# Patient Record
Sex: MALE | Race: WHITE | Employment: STUDENT | URBAN - METROPOLITAN AREA
[De-identification: names, ages, dates, MRNs, and addresses within clinical notes are randomized per-mention and may not be internally consistent; named-entity substitution may affect disease eponyms.]

---

## 2019-07-25 PROCEDURE — 87491 CHLMYD TRACH DNA AMP PROBE: CPT | Performed by: FAMILY MEDICINE

## 2019-07-25 PROCEDURE — 87591 N.GONORRHOEAE DNA AMP PROB: CPT | Performed by: FAMILY MEDICINE

## 2024-12-31 ENCOUNTER — APPOINTMENT (OUTPATIENT)
Dept: GENERAL RADIOLOGY | Age: 24
End: 2024-12-31
Attending: PHYSICIAN ASSISTANT
Payer: COMMERCIAL

## 2024-12-31 ENCOUNTER — HOSPITAL ENCOUNTER (OUTPATIENT)
Age: 24
Discharge: HOME | End: 2024-12-31
Payer: COMMERCIAL

## 2024-12-31 VITALS
OXYGEN SATURATION: 99 % | SYSTOLIC BLOOD PRESSURE: 134 MMHG | TEMPERATURE: 98 F | DIASTOLIC BLOOD PRESSURE: 87 MMHG | HEART RATE: 77 BPM | RESPIRATION RATE: 20 BRPM

## 2024-12-31 DIAGNOSIS — R05.1 ACUTE COUGH: ICD-10-CM

## 2024-12-31 DIAGNOSIS — J40 BRONCHITIS: Primary | ICD-10-CM

## 2024-12-31 LAB
POCT INFLUENZA A: NEGATIVE
POCT INFLUENZA B: NEGATIVE

## 2024-12-31 PROCEDURE — 71046 X-RAY EXAM CHEST 2 VIEWS: CPT | Performed by: PHYSICIAN ASSISTANT

## 2024-12-31 RX ORDER — BENZONATATE 200 MG/1
200 CAPSULE ORAL 3 TIMES DAILY PRN
Qty: 30 CAPSULE | Refills: 0 | Status: SHIPPED | OUTPATIENT
Start: 2024-12-31

## 2024-12-31 RX ORDER — PREDNISONE 20 MG/1
40 TABLET ORAL DAILY
Qty: 10 TABLET | Refills: 0 | Status: SHIPPED | OUTPATIENT
Start: 2024-12-31 | End: 2025-01-05

## 2024-12-31 RX ORDER — ALBUTEROL SULFATE 90 UG/1
2 INHALANT RESPIRATORY (INHALATION) ONCE
Status: COMPLETED | OUTPATIENT
Start: 2024-12-31 | End: 2024-12-31

## 2024-12-31 RX ORDER — DEXTROMETHORPHAN HBR, GUAIFENESIN 60; 1200 MG/1; MG/1
1 TABLET, EXTENDED RELEASE ORAL EVERY 12 HOURS
Qty: 30 TABLET | Refills: 0 | Status: SHIPPED | OUTPATIENT
Start: 2024-12-31

## 2024-12-31 NOTE — DISCHARGE INSTRUCTIONS
Recommendations:    - Motrin every 6-8 hours as needed for pain/fever  - Rest  - Proper Sleep Regimen  - Increase Water Intake  - Mucinex for cough  - Tessalon prescription for cough suppressant  - Allegra-D for nasal/sinus decongestant  - Prednisone 5 days for bronchitis/chest congestion  - Albuterol inhaler as needed every 4-6 hours for coughing fits, wheezing, shortness of breath

## 2024-12-31 NOTE — ED PROVIDER NOTES
Patient Seen in: [facility]      History     Chief Complaint   Patient presents with    Cough     Want to check if I have walking pneumonia. - Entered by patient     Stated Complaint: Cough - Want to check if I have walking pneumonia.    Subjective:   HPI    Patient is a 24-year-old male, presenting to immediate care for evaluation of a acute cough.  Onset: 5 days.  Associated nasal congestion, chest congestion, cough, coughing fits, with associated feeling winded/out of breath with coughing fits and predominately with laying down.  No fevers or chills or myalgias.  No current chest pain or shortness of breath.  Recent sick contacts.  Coming to immediate care for further evaluation and x-ray imaging to rule out underlying walking pneumonia.  Home COVID test negative. Not immunocompromise.      Objective:     History reviewed. No pertinent past medical history.           History reviewed. No pertinent surgical history.             Social History     Socioeconomic History    Marital status: Single   Tobacco Use    Smoking status: Never     Passive exposure: Never    Smokeless tobacco: Never   Vaping Use    Vaping status: Never Used              Review of Systems   Constitutional:  Negative for chills and fever.   HENT:  Positive for congestion. Negative for ear discharge, ear pain, facial swelling, sore throat, trouble swallowing and voice change.    Eyes:  Negative for visual disturbance.   Respiratory:  Positive for cough, chest tightness and shortness of breath.    Gastrointestinal:  Negative for abdominal pain, diarrhea and vomiting.   Musculoskeletal:  Negative for neck pain and neck stiffness.   Skin:  Negative for rash.   Neurological:  Negative for dizziness, seizures, weakness and light-headedness.   Psychiatric/Behavioral:  Negative for confusion.    All other systems reviewed and are negative.      Positive for stated complaint: Cough - Want to check if I have walking pneumonia.  Other  systems are as noted in HPI.  Constitutional and vital signs reviewed.      All other systems reviewed and negative except as noted above.    Physical Exam     ED Triage Vitals [12/31/24 1515]   /87   Pulse 77   Resp 20   Temp 98.2 °F (36.8 °C)   Temp src    SpO2 99 %   O2 Device None (Room air)       Current Vitals:   Vital Signs  BP: 134/87  Pulse: 77  Resp: 20  Temp: 98.2 °F (36.8 °C)    Oxygen Therapy  SpO2: 99 %  O2 Device: None (Room air)        Physical Exam  Vitals and nursing note reviewed.   Constitutional:       General: He is not in acute distress.     Appearance: Normal appearance. He is not ill-appearing, toxic-appearing or diaphoretic.      Comments: Alert, cooperative well-appearing, no acute distress   HENT:      Head: Normocephalic and atraumatic.      Right Ear: Tympanic membrane normal.      Left Ear: Tympanic membrane normal.      Nose: Congestion present.      Comments: Nasal congestion, postnasal drip.     Mouth/Throat:      Mouth: Mucous membranes are moist.   Eyes:      Conjunctiva/sclera: Conjunctivae normal.   Cardiovascular:      Rate and Rhythm: Normal rate.      Pulses: Normal pulses.   Pulmonary:      Effort: Pulmonary effort is normal. No respiratory distress.      Breath sounds: Normal breath sounds.      Comments: Occasionally coughing-nonproductive with bronchospasms.  Lungs without rales or wheezing.  No conversational dyspnea or retractions or increased work of breathing  Musculoskeletal:         General: No deformity. Normal range of motion.      Cervical back: Normal range of motion and neck supple. No rigidity.   Neurological:      General: No focal deficit present.      Mental Status: He is alert and oriented to person, place, and time.      Motor: No weakness.      Gait: Gait normal.   Psychiatric:         Mood and Affect: Mood normal.         Behavior: Behavior normal.           ED Course     Labs Reviewed   POCT FLU TEST - Normal    Narrative:     This assay is a  rapid molecular in vitro test utilizing nucleic acid amplification of influenza A and B viral RNA.     XR CHEST PA + LAT CHEST (CPT=71046)   Final Result   PROCEDURE: XR CHEST PA + LAT CHEST (CPT=71046)       COMPARISON: None.       INDICATIONS: Cough for 5 days.       TECHNIQUE:   Two views.         FINDINGS:    CARDIAC/VASC: No cardiac silhouette abnormality or cardiomegaly.     Unremarkable pulmonary vasculature.     MEDIAST/SHYLA: No visible mass or adenopathy.    LUNGS/PLEURA: Bilateral perihilar/peribronchial thickening.  No other    focal airspace disease, pleural effusion, or pneumothorax.   BONES: Slight S-shaped thoracic scoliosis.   OTHER: Negative.                     =====   CONCLUSION:    1. No focal airspace disease.   2. Perihilar/peribronchial thickening, which can be seen in the setting of    bronchitis.           [id number]        Dictated by (CST): Baltazar Gonzales MD on 12/31/2024 at 3:36 PM        Finalized by (CST): Baltazar Gonzales MD on 12/31/2024 at 3:37 PM                 Results for orders placed or performed during the hospital encounter of 12/31/24   POCT Flu Test    Collection Time: 12/31/24  3:23 PM    Specimen: Nares; Other   Result Value Ref Range    POCT INFLUENZA A Negative Negative    POCT INFLUENZA B Negative Negative            MDM     Differential diagnoses considered included, but are not exclusive of: URI, influenza, COVID, otitis, sinusitis, pharyngitis, strep throat, bronchitis, pneumonia, etc.    Dx: Acute bronchitis, initial encounter  Onset: 5 days  Recent viral URI with cough and congestion  Influenza PCR negative  Home COVID test negative  Patient not immunocompromise  Afebrile  No hypoxia  Nontoxic-appearing  Lungs: Lungs, acute bronchospasms  Oral prednisone given for bronchitis  Chest x-ray: No focal consolidation, no pneumonia, effusion  Plan for outpatient management  Supportive care  Rest  Oral hydration  OTC Motrin/Tylenol as needed for pain/fever/mild  Rx Mucinex  and tessalon for cough  Rx prednisone for 5 days for acute bronchitis  Provided albuterol inhaler with MDI teaching by RN as needed shortness of breath/wheezing  Stable for discharge  Discharge on bronchitis provided  PCP follow-up  ED return precaution      Medical Decision Making      Disposition and Plan     Clinical Impression:  1. Bronchitis    2. Acute cough         Disposition:  Discharge  12/31/2024  3:41 pm    Follow-up:  No follow-up provider specified.        Medications Prescribed:  Current Discharge Medication List        START taking these medications    Details   dextromethorphan-guaifenesin ER (MUCINEX DM MAXIMUM STRENGTH)  MG Oral Tablet 12 Hr Take 1 tablet by mouth every 12 (twelve) hours.  Qty: 30 tablet, Refills: 0      predniSONE 20 MG Oral Tab Take 2 tablets (40 mg total) by mouth daily for 5 days.  Qty: 10 tablet, Refills: 0      benzonatate 200 MG Oral Cap Take 1 capsule (200 mg total) by mouth 3 (three) times daily as needed for cough.  Qty: 30 capsule, Refills: 0                 Supplementary Documentation: